# Patient Record
Sex: FEMALE | Race: WHITE | NOT HISPANIC OR LATINO | Employment: OTHER
[De-identification: names, ages, dates, MRNs, and addresses within clinical notes are randomized per-mention and may not be internally consistent; named-entity substitution may affect disease eponyms.]

---

## 2021-09-21 ENCOUNTER — TRANSCRIBE ORDERS (OUTPATIENT)
Dept: OTHER | Age: 66
End: 2021-09-21

## 2021-09-21 DIAGNOSIS — Z80.41 FAMILY HISTORY OF OVARIAN CANCER: Primary | ICD-10-CM

## 2021-09-25 ENCOUNTER — HEALTH MAINTENANCE LETTER (OUTPATIENT)
Age: 66
End: 2021-09-25

## 2021-11-12 NOTE — PROGRESS NOTES
11/15/2021    Referring Provider: Tammy Smith MD    Presenting Information:   I met with Imani Ghosh today for her telephone genetic counseling visit through the Cancer Risk Management Program to discuss her family history of ovarian, breast, and stomach cancer. She is here today to review this history, cancer screening recommendations, and available genetic testing options.    Personal History:  Imani is a 66 year old female. She does not have any personal history of cancer. In her hormonal-based medical history, she had her first menstrual period at age 12/13, does not have biological children, and underwent menopause in her early 50's. Imani has her ovaries, fallopian tubes and uterus in place. Angela had a pelvis ultrasound on 9/22/21 which found two small uterine fibroids. The ovaries were not visualized. She reports that she has no history of hormone replacement therapy.  She reports no oral contraceptive use.       Imani's most recent mammogram in March 2021 was normal. Follow-up was recommended annually. Imani began having colonoscopies at the age of 61. Her most recent colonoscopy in April 2016 found one sessile serrated adenoma in the descending colon. Follow-up was recommended in 5 years. Imani denies any history of dermatological exams. She does not regularly do any other cancer screening at this time. Imani reported no history of smoking and occasional alcohol use.    Family History: (Please see scanned pedigree for detailed family history information)    Imani's sister was diagnosed with ovarian cancer at age 65. Her sister has not had genetic testing and she does not know if her sister would be interested in genetic testing.     Maternal grandfather was diagnosed with stomach cancer at age 70. He passed away at age 82.     Mother's paternal grandmother (mIani's great grandfather) was diagnosed with stomach cancer at an unknown age.     Maternal grandmother's sister (Imani's great aunt) was  diagnosed with breast cancer in her 40's.     Paternal grandmother was diagnosed with cancer in her abdominal region. She passed away in her 70's.     Her maternal ethnicity is Nigerian and Mohawk. Her paternal ethnicity is Marshallese, Romansh, and Sinhala.  There is no known Ashkenazi Shinto ancestry on either side of her family. There is no reported consanguinity.    Discussion:  Imani's family history of ovarian, breast, and stomach cancer is suggestive of a hereditary cancer syndrome.  We reviewed the features of sporadic, familial, and hereditary cancers. We discussed the natural history and genetics of several hereditary cancer syndromes, including Hereditary Breast and Ovarian Cancer Syndrome (HBOC).   The most common cause of hereditary breast cancer is HBOC, which is caused by mutations in the BRCA1 and BRCA2 genes. Individuals with HBOC syndrome are at increased risk for several different cancers, including breast, ovarian, male breast, prostate, melanoma, and pancreatic cancer. HBOC typically presents with multiple family members diagnosed with breast cancer before age 50 and/or ovarian cancer.   A detailed handout regarding information about HBOC and related hereditary cancer syndromes will be provided to Imani via [employer] and can be found in the after visit summary. Topics included: inheritance pattern, cancer risks, cancer screening recommendations, and also risks, benefits and limitations of testing.  In looking at Imani's family history, it is possible that a cancer susceptibility gene is present due to her sister's diagnosis of ovarian cancer.  Based on her personal and family history, Imani meets current National Comprehensive Cancer Network (NCCN) criteria for genetic testing of high-penetrance ovarian cancer susceptibility genes (including BRCA1, BRCA2, BRIP1, MLH1, MSH2, MSH6, PMS2, EPCAM, PALB2, RAD51C,and  RAD51D).   We discussed that there are additional genes that could cause increased risk  for ovarian, breast, and/or stomach cancer. As many of these genes present with overlapping features in a family and accurate cancer risk cannot always be established based upon the pedigree analysis alone, it would be reasonable for Imani to consider panel genetic testing to analyze multiple genes at once.  We reviewed genetic testing options for Imani based on her personal and family history: a panel of genes associated with an increased risk for  hereditary breast and gynecologic cancers, or larger panel options to include genes associated with increased risk for multiple different cancer types. Imani expressed interest in [employer]'s Common Hereditary Cancers panel.   Genetic testing is available for 47 genes associated with cancers of the breast, ovary, uterus, prostate and gastrointestinal system: [employer] Common Hereditary Cancers panel (APC, SHARAD, AXIN2, BARD1, BMPR1A, BRCA1, BRCA2, BRIP1, CDH1, CDK4, CDKN2A, CHEK2, CTNNA1, DICER1, EPCAM, GREM1, HOXB13, KIT, MEN1, MLH1, MSH2, MSH3, MSH6, MUTYH, NBN, NF1, NTHL1, PALB2, PDGFRA, PMS2, POLD1, POLE, PTEN,RAD50, RAD51C, RAD51D, SDHA, SDHB, SDHC, SDHD, SMAD4, SMARCA4, STK11, TP53,TSC1, TSC2, VHL).    We discussed that many of these genes are associated with specific hereditary cancer syndromes and published management guidelines: Hereditary Breast and Ovarian Cancer syndrome (BRCA1, BRCA2), Ruiz syndrome (MLH1, MSH2, MSH6, PMS2, EPCAM), Familial Adenomatous Polyposis (APC), Hereditary Diffuse Gastric Cancer (CDH1), Familial Atypical Multiple Mole Melanoma syndrome (CDK4, CDKN2A), Multiple Endocrine Neoplasia type 1 (MEN1), Juvenile Polyposis syndrome (BMPR1A, SMAD4), Cowden syndrome (PTEN), Li Fraumeni syndrome (TP53), Hereditary Paraganglioma and Pheochromocytoma syndrome (SDHA, SDHB, SDHC, SDHD), Peutz-Jeghers syndrome (STK11), MUTYH Associated Polyposis (MUTYH), Tuberous sclerosis complex (TSC1, TSC2), Von Hippel-Lindau disease (VHL), and Neurofibromatosis type  1 (NF1).   The SHARAD, AXIN2, BRIP1, CHEK2, GREM1, MSH3, NBN, NTHL1, PALB2, POLD1, POLE, RAD51C, and RAD51D genes are associated with increased cancer risk and have published management guidelines for certain cancers.   The remaining genes (BARD1, CTNNA1, DICER1, HOXB13, KIT, PDGFRA, RAD50, and SMARCA4) are associated with increased cancer risk and may allow us to make medical recommendations when mutations are identified.   Imani would like to submit a blood sample for her genetic testing. She will go to her nearest [facility] at her earliest convenience to get her blood drawn for her genetic testing.   Verbal consent was given over the phone and written on the consent form. Turnaround time is approximately 4-6 weeks once the lab receives the sample.    Medical Management: For Imani, we reviewed that the information from genetic testing may determine:    additional cancer screening for which Imani may qualify (i.e. mammogram and breast MRI, more frequent colonoscopies, more frequent dermatologic exams, etc.),    options for risk reducing surgeries Imani could consider (i.e. bilateral mastectomy, surgery to remove her ovaries and/or uterus, etc.),      and targeted chemotherapies if she were to develop certain cancers in the future (i.e. immunotherapy for individuals with Ruiz syndrome, PARP inhibitors, etc.).     These recommendations and possible targeted chemotherapies will be discussed in detail once genetic testing is completed.     Plan:  1) Today Imani elected to proceed with the Common Hereditary Cancers Panel through [email or web address]. Therefore, consent was reviewed and verbally obtained for this testing.   2) Imani plans to schedule her blood draw appointment at a clinic that is convenient to her.   3) The results should be available in 4-6 weeks after her blood is drawn.  4) Imani will either have a telephone visit or video visit to discuss the results.  Additional recommendations  about screening will be made at that time.    Imani is a 66 year old who is being evaluated via a billable telephone visit.    Phone call duration: 45 minutes    Bren Villa MS  Genetic Counseling Intern  (P): [phone]    Attestation: Patient seen, evaluated and discussed with the Genetic Counseling Intern. I have verified the content of the note, which accurately reflects my assessment of the patient and the plan of care.     Supervising Genetic Counselor  Sy Harris MS, [employer] [facility] [employer]  Licensed, Certified Genetic Counselor  (P): [phone]  (F): [phone]

## 2021-11-15 ENCOUNTER — VIRTUAL VISIT (OUTPATIENT)
Dept: ONCOLOGY | Facility: CLINIC | Age: 66
End: 2021-11-15
Attending: INTERNAL MEDICINE
Payer: MEDICARE

## 2021-11-15 DIAGNOSIS — Z80.3 FAMILY HISTORY OF MALIGNANT NEOPLASM OF BREAST: ICD-10-CM

## 2021-11-15 DIAGNOSIS — Z80.41 FAMILY HISTORY OF MALIGNANT NEOPLASM OF OVARY: Primary | ICD-10-CM

## 2021-11-15 DIAGNOSIS — Z80.0 FAMILY HISTORY OF STOMACH CANCER: ICD-10-CM

## 2021-11-15 PROCEDURE — 96040 HC GENETIC COUNSELING, EACH 30 MINUTES: CPT | Mod: 95 | Performed by: GENETIC COUNSELOR, MS

## 2021-11-15 NOTE — LETTER
Cancer Risk Management  Program Locations    [facility]  [facility]  [facility]  Mailing Address  Cancer Risk Management Program  [address]  [place] [address]    New patient appointments  [phone]  November 15, 2021    Imani C Augie  [id number] [facility] [id number]    Dear Imani,    It was a pleasure speaking with you on the phone on November 15, 2021. Here is a copy of the progress note from our discussion. If you have any additional questions, please feel free to call.    Referring Provider: Tammy Smith MD    Presenting Information:   I met with Imani Augie today for her telephone genetic counseling visit through the Cancer Risk Management Program to discuss her family history of ovarian, breast, and stomach cancer. She is here today to review this history, cancer screening recommendations, and available genetic testing options.    Personal History:  Imani is a 66 year old female. She does not have any personal history of cancer. In her hormonal-based medical history, she had her first menstrual period at age 12/13, does not have biological children, and underwent menopause in her early 50's. Imani has her ovaries, fallopian tubes and uterus in place. Angela had a pelvis ultrasound on 9/22/21 which found two small uterine fibroids. The ovaries were not visualized. She reports that she has no history of hormone replacement therapy.  She reports no oral contraceptive use.       Imani's most recent mammogram in March 2021 was normal. Follow-up was recommended annually. Imani began having colonoscopies at the age of 61. Her most recent colonoscopy in April 2016 found one sessile serrated adenoma in the descending colon. Follow-up was recommended in 5 years. Imani denies any history of  dermatological exams. She does not regularly do any other cancer screening at this time. Imani reported no history of smoking and occasional alcohol use.    Family History: (Please see scanned pedigree for detailed family history information)    Imani's sister was diagnosed with ovarian cancer at age 65. Her sister has not had genetic testing and she does not know if her sister would be interested in genetic testing.     Maternal grandfather was diagnosed with stomach cancer at age 70. He passed away at age 82.     Mother's paternal grandmother (Imani's great grandfather) was diagnosed with stomach cancer at an unknown age.     Maternal grandmother's sister (Imani's great aunt) was diagnosed with breast cancer in her 40's.     Paternal grandmother was diagnosed with cancer in her abdominal region. She passed away in her 70's.     Her maternal ethnicity is Mongolian and Jay. Her paternal ethnicity is Bol[place]ian, B[place]y, and Icelandic.  There is no known Ashkenazi Jehovah's witness ancestry on either side of her family. There is no reported consanguinity.    Discussion:  Imani's family history of ovarian, breast, and stomach cancer is suggestive of a hereditary cancer syndrome.  We reviewed the features of sporadic, familial, and hereditary cancers. We discussed the natural history and genetics of several hereditary cancer syndromes, including Hereditary Breast and Ovarian Cancer Syndrome (HBOC).   The most common cause of hereditary breast cancer is HBOC, which is caused by mutations in the BRCA1 and BRCA2 genes. Individuals with HBOC syndrome are at increased risk for several different cancers, including breast, ovarian, male breast, prostate, melanoma, and pancreatic cancer. HBOC typically presents with multiple family members diagnosed with breast cancer before age 50 and/or ovarian cancer.   A detailed handout regarding information about HBOC and related hereditary cancer syndromes will be provided to Imani via [employer]  and can be found in the after visit summary. Topics included: inheritance pattern, cancer risks, cancer screening recommendations, and also risks, benefits and limitations of testing.  In looking at Imani's family history, it is possible that a cancer susceptibility gene is present due to her sister's diagnosis of ovarian cancer.  Based on her personal and family history, Imani meets current National Comprehensive Cancer Network (NCCN) criteria for genetic testing of high-penetrance ovarian cancer susceptibility genes (including BRCA1, BRCA2, BRIP1, MLH1, MSH2, MSH6, PMS2, EPCAM, PALB2, RAD51C,and  RAD51D).   We discussed that there are additional genes that could cause increased risk for ovarian, breast, and/or stomach cancer. As many of these genes present with overlapping features in a family and accurate cancer risk cannot always be established based upon the pedigree analysis alone, it would be reasonable for Imani to consider panel genetic testing to analyze multiple genes at once.  We reviewed genetic testing options for Imani based on her personal and family history: a panel of genes associated with an increased risk for  hereditary breast and gynecologic cancers, or larger panel options to include genes associated with increased risk for multiple different cancer types. Imani expressed interest in [employer]'s Common Hereditary Cancers panel.   Genetic testing is available for 47 genes associated with cancers of the breast, ovary, uterus, prostate and gastrointestinal system: [employer] Common Hereditary Cancers panel (APC, SHARAD, AXIN2, BARD1, BMPR1A, BRCA1, BRCA2, BRIP1, CDH1, CDK4, CDKN2A, CHEK2, CTNNA1, DICER1, EPCAM, GREM1, HOXB13, KIT, MEN1, MLH1, MSH2, MSH3, MSH6, MUTYH, NBN, NF1, NTHL1, PALB2, PDGFRA, PMS2, POLD1, POLE, PTEN,RAD50, RAD51C, RAD51D, SDHA, SDHB, SDHC, SDHD, SMAD4, SMARCA4, STK11, TP53,TSC1, TSC2, VHL).    We discussed that many of these genes are associated with specific hereditary cancer  syndromes and published management guidelines: Hereditary Breast and Ovarian Cancer syndrome (BRCA1, BRCA2), Ruiz syndrome (MLH1, MSH2, MSH6, PMS2, EPCAM), Familial Adenomatous Polyposis (APC), Hereditary Diffuse Gastric Cancer (CDH1), Familial Atypical Multiple Mole Melanoma syndrome (CDK4, CDKN2A), Multiple Endocrine Neoplasia type 1 (MEN1), Juvenile Polyposis syndrome (BMPR1A, SMAD4), Cowden syndrome (PTEN), Li Fraumeni syndrome (TP53), Hereditary Paraganglioma and Pheochromocytoma syndrome (SDHA, SDHB, SDHC, SDHD), Peutz-Jeghers syndrome (STK11), MUTYH Associated Polyposis (MUTYH), Tuberous sclerosis complex (TSC1, TSC2), Von Hippel-Lindau disease (VHL), and Neurofibromatosis type 1 (NF1).   The SHARAD, AXIN2, BRIP1, CHEK2, GREM1, MSH3, NBN, NTHL1, PALB2, POLD1, POLE, RAD51C, and RAD51D genes are associated with increased cancer risk and have published management guidelines for certain cancers.   The remaining genes (BARD1, CTNNA1, DICER1, HOXB13, KIT, PDGFRA, RAD50, and SMARCA4) are associated with increased cancer risk and may allow us to make medical recommendations when mutations are identified.   Imani would like to submit a blood sample for her genetic testing. She will go to her nearest [facility] at her earliest convenience to get her blood drawn for her genetic testing.   Verbal consent was given over the phone and written on the consent form. Turnaround time is approximately 4-6 weeks once the lab receives the sample.    Medical Management: For Imani, we reviewed that the information from genetic testing may determine:    additional cancer screening for which Imani may qualify (i.e. mammogram and breast MRI, more frequent colonoscopies, more frequent dermatologic exams, etc.),    options for risk reducing surgeries Imani could consider (i.e. bilateral mastectomy, surgery to remove her ovaries and/or uterus, etc.),      and targeted chemotherapies if she were to develop certain cancers  in the future (i.e. immunotherapy for individuals with Ruiz syndrome, PARP inhibitors, etc.).     These recommendations and possible targeted chemotherapies will be discussed in detail once genetic testing is completed.     Plan:  1) Today Imani elected to proceed with the Common Hereditary Cancers Panel through [employer]. Therefore, consent was reviewed and verbally obtained for this testing.   2) Imani plans to schedule her blood draw appointment at a clinic that is convenient to her.   3) The results should be available in 4-6 weeks after her blood is drawn.  4) Imani will either have a telephone visit or video visit to discuss the results.  Additional recommendations about screening will be made at that time.    Phone call duration: 45 minutes    Bren Villa MS  Genetic Counseling Intern  (P): [phone]    Attestation: Patient seen, evaluated and discussed with the Genetic Counseling Intern. I have verified the content of the note, which accurately reflects my assessment of the patient and the plan of care.     Supervising Genetic Counselor  Sy Harris MS, [facility]  Licensed, Certified Genetic Counselor  (P): [phone]

## 2021-12-02 ENCOUNTER — LAB (OUTPATIENT)
Dept: LAB | Facility: CLINIC | Age: 66
End: 2021-12-02
Attending: GENETIC COUNSELOR, MS
Payer: MEDICARE

## 2021-12-02 DIAGNOSIS — Z80.41 FAMILY HISTORY OF MALIGNANT NEOPLASM OF OVARY: ICD-10-CM

## 2021-12-02 DIAGNOSIS — Z80.0 FAMILY HISTORY OF STOMACH CANCER: ICD-10-CM

## 2021-12-02 PROCEDURE — 36415 COLL VENOUS BLD VENIPUNCTURE: CPT

## 2021-12-02 NOTE — NURSING NOTE
Chief Complaint   Patient presents with     Labs Only     Labs drawn by  in lab by RN.      Ag Mulligan RN

## 2021-12-06 NOTE — PATIENT INSTRUCTIONS
Assessing Cancer Risk  Only about 5-10% of cancers are thought to be due to an inherited cancer susceptibility gene.    These families often have:    Several people with the same or related types of cancer    Cancers diagnosed at a young age (before age 50)    Individuals with more than one primary cancer    Multiple generations of the family affected with cancer    Some people may be candidates for genetic testing of more than one gene.  For these families, genetic testing using a cancer panel may be offered.  These panels will test different genes known to increase the risk for breast, ovarian, uterine, and/or other cancers. All of the genes discussed below have published clinical management guidelines for individuals who are found to carry a mutation. The purpose of this handout is to serve as a brief summary of the genes analyzed by the panels used to inquire about hereditary breast and gynecologic cancer:  SHARAD, BRCA1, BRCA2, BRIP1, CDH1, CHEK2, MLH1, MSH2, MSH6, PMS2, EPCAM, PTEN, PALB2, RAD51C, RAD51D, and TP53.  ______________________________________________________________________________  Hereditary Breast and Ovarian Cancer Syndrome   (BRCA1 and BRCA2)  A single mutation in one of the copies of BRCA1 or BRCA2 increases the risk for breast and ovarian cancer, among others.  The risk for pancreatic cancer and melanoma may also be slightly increased in some families.  The chart below shows the chance that someone with a BRCA mutation would develop cancer in his or her lifetime1,2,3,4.        A person s ethnic background is also important to consider, as individuals of Ashkenazi Alevism ancestry have a higher chance of having a BRCA gene mutation.  There are three BRCA mutations that occur more frequently in this population.    Ruiz Syndrome   (MLH1, MSH2, MSH6, PMS2, and EPCAM)  Currently five genes are known to cause Ruiz Syndrome: MLH1, MSH2, MSH6, PMS2, and EPCAM.  A single mutation in one of the  Ruiz Syndrome genes increases the risk for colon, endometrial, ovarian, and stomach cancers.  Other cancers that occur less commonly in Ruiz Syndrome include urinary tract, skin, and brain cancers.  The chart below shows the chance that a person with Ruiz syndrome would develop cancer in his or her lifetime5.      *Cancer risk varies depending on Ruiz syndrome gene found    Cowden Syndrome   (PTEN)  Cowden syndrome is a hereditary condition that increases the risk for breast, thyroid, endometrial, colon, and kidney cancer.  Cowden syndrome is caused by a mutation in the PTEN gene.  A single mutation in one of the copies of PTEN causes Cowden syndrome and increases cancer risk.  The chart below shows the chance that someone with a PTEN mutation would develop cancer in their lifetime6,7.  Other benign features seen in some individuals with Cowden syndrome include benign skin lesions (facial papules, keratoses, lipomas), learning disability, autism, thyroid nodules, colon polyps, and larger head size.      *One recent study found breast cancer risk to be increased to 85%    Li-Fraumeni Syndrome   (TP53)  Li-Fraumeni Syndrome (LFS) is a cancer predisposition syndrome caused by a mutation in the TP53 gene. A single mutation in one of the copies of TP53 increases the risk for multiple cancers. Individuals with LFS are at an increased risk for developing cancer at a young age. The lifetime risk for development of a LFS-associated cancer is 50% by age 30 and 90% by age 60.   Core Cancers: Sarcomas, Breast, Brain, Lung, Leukemias/Lymphomas, Adrenocortical carcinomas  Other Cancers: Gastrointestinal, Thyroid, Skin, Genitourinary    Hereditary Diffuse Gastric Cancer   (CDH1)  Currently, one gene is known to cause hereditary diffuse gastric cancer (HDGC): CDH1.  Individuals with HDGC are at increased risk for diffuse gastric cancer and lobular breast cancer. Of people diagnosed with HDGC, 30-50% have a mutation in the CDH1  gene.  This suggests there are likely other genes that may cause HDGC that have not been identified yet.      Lifetime Cancer Risks    General Population HDGC    Diffuse Gastric  <1% ~80%   Breast 12% 39-52%         Additional Genes  SHARAD  SHARAD is a moderate-risk breast cancer gene. Women who have a mutation in SHARAD can have between a 2-4 fold increased risk for breast cancer compared to the general population8. SHARAD mutations have also been associated with increased risk for pancreatic cancer, however an estimate of this cancer risk is not well understood9. Individuals who inherit two SHARAD mutations have a condition called ataxia-telangiectasia (AT).  This rare autosomal recessive condition affects the nervous system and immune system, and is associated with progressive cerebellar ataxia beginning in childhood.  Individuals with ataxia-telangiectasia often have a weakened immune system and have an increased risk for childhood cancers.    PALB2  Mutations in PALB2 have been shown to increase the risk of breast cancer up to 33-58% in some families; where individuals fall within this risk range is dependent upon family chufveg11. PALB2 mutations have also been associated with increased risk for pancreatic cancer, although this risk has not been quantified yet.  Individuals who inherit two PALB2 mutations--one from their mother and one from their father--have a condition called Fanconi Anemia.  This rare autosomal recessive condition is associated with short stature, developmental delay, bone marrow failure, and increased risk for childhood cancers.    CHEK2   CHEK2 is a moderate-risk breast cancer gene.  Women who have a mutation in CHEK2 have around a 2-fold increased risk for breast cancer compared to the general population, and this risk may be higher depending upon family history.11,12,13 Mutations in CHEK2 have also been shown to increase the risk of a number of other cancers, including colon and prostate, however  these cancer risks are currently not well understood.    BRIP1, RAD51C and RAD51D  Mutations in BRIP1, RAD51C, and RAD51D have been shown to increase the risk of ovarian cancer and possibly female breast cancer as well14,15 .       Lifetime Cancer Risk    General Population BRIP1 RAD51C RAD51D   Ovarian 1-2% ~5-8% ~5-9% ~7-15%           Inheritance  All of the cancer syndromes reviewed above are inherited in an autosomal dominant pattern.  This means that if a parent has a mutation, each of his or her children will have a 50% chance of inheriting that same mutation.  Therefore, each child--male or female--would have a 50% chance of being at increased risk for developing cancer.      Image obtained from Genetics Home Reference, 2013     Mutations in some genes can occur de raymond, which means that a person s mutation occurred for the first time in them and was not inherited from a parent.  Now that they have the mutation, however, it can be passed on to future generations.    Genetic Testing  Genetic testing involves a blood test and will look at the genetic information in the SHARAD, BRCA1, BRCA2, BRIP1, CDH1, CHEK2, MLH1, MSH2, MSH6, PMS2, EPCAM, PTEN, PALB2, RAD51C, RAD51D, and TP53 genes for any harmful mutations that are associated with increased cancer risk.  If possible, it is recommended that the person(s) who has had cancer be tested before other family members.  That person will give us the most useful information about whether or not a specific gene is associated with the cancer in the family.    Results  There are three possible results of genetic testing:    Positive--a harmful mutation was identified in one or more of the genes    Negative--no mutation was identified in any of the genes on this panel    Variant of unknown significance--a variation in one of the genes was identified, but it is unclear how this impacts cancer risk in the family    Advantages and Disadvantages   There are advantages and  disadvantages to genetic testing.    Advantages    May clarify your cancer risk    Can help you make medical decisions    May explain the cancers in your family    May give useful information to your family members (if you share your results)    Disadvantages    Possible negative emotional impact of learning about inherited cancer risk    Uncertainty in interpreting a negative test result in some situations    Possible genetic discrimination concerns (see below)    Genetic Information Nondiscrimination Act (KAREN)  KAREN is a federal law that protects individuals from health insurance or employment discrimination based on a genetic test result alone.  Although rare, there are currently no legal discrimination protections in terms of life insurance, long term care, or disability insurances.  Visit the [employer] [place] website to learn more.    Reducing Cancer Risk  All of the genes described above have nationally recognized cancer screening guidelines that would be recommended for individuals who test positive.  In addition to increased cancer screening, surgeries may be offered or recommended to reduce cancer risk.  Recommendations are based upon an individual s genetic test result as well as their personal and family history of cancer.    Questions to Think About Regarding Genetic Testing:    What effect will the test result have on me and my relationship with my family members if I have an inherited gene mutation?  If I don t have a gene mutation?    Should I share my test results, and how will my family react to this news, which may also affect them?    Are my children ready to learn new information that may one day affect their own health?    Hereditary Cancer Resources    FORCE: Facing Our Risk of Cancer Empowered facingourrisk.org   Bright Pink bebrightpink.org   Li-Fraumeni Syndrome Association lfsassociation.org   PTEN World PTENworld.com   No stomach for cancer, Inc.  nostomachforcancer.org   Stomach cancer relief network Scrnet.org   Collaborative Group of the Americas on Inherited Colorectal Cancer (CGA) cgaicc.com    Cancer Care cancercare.org   American Cancer Society (ACS) cancer.org   National Cancer Otto (NCI) cancer.gov     Please call us if you have any questions or concerns.   Cancer Risk Management Program [phone] ([phone])  ? Sy Harris, MS, [facility] [place] [phone]  ? Nunu Enriquez, MS, [facility] [place]  [phone]  ? Miranda Flowers, MS, [facility] [place]  [phone]  ? Melita Wilmer, MS, [facility] [place] [phone]  ? Beatrice Sherlyn, MS, [facility] [place] [phone]  ? Tressa Marcel, MS, [facility] [place]  [phone]  ? Bren Villa, MS  [phone]    References  1. Jose SHEEHAN, Rudolph PDP, Tabitha S, Luis ALLAN, Serge JE, Vicki JL, Debora N, Miles H, Neo O, Bethanie A, Yordy B, Radicydni P, Mansourav S, Antonino DM, Estes N, Belen E, Augustine H, Mateo E, Odilon J, Grongavin J, Carmela B, Adina H, Thorlacius S, Eerola H, Nevjuancarlosna H, Betsy K, Edward OP. Average risks of breast and ovarian cancer associated with BRCA1 or BRCA2 mutations detected in case series unselected for family history: a combined analysis of 222 studies. Am J Hum Briseida. 2003;72:1117-30.  2. Robert N, Belkis M, Eri G.  BRCA1 and BRCA2 Hereditary Breast and Ovarian Cancer. Gene Reviews online. 2013.  3. Rizwan YC, Stefano S, Brenda G, Gonzales S. Breast cancer risk among male BRCA1 and BRCA2 mutation carriers. J Natl Cancer Inst. 2007;99:1811-4.  4. Juan Daniel MARTINEZ, Daphne I, Dada J, Jose E, Anjelica ER, Vipin F. Risk of breast cancer in male BRCA2 carriers. J Med Briseida. 2010;47:710-1.  5. National Comprehensive Cancer Network. Clinical practice guidelines in oncology, colorectal cancer screening. Available online (registration required). 2015.  6. Souleymane MARTINEZ, Suzanna J, Von J, Nancy LA, Margarito TAMAYO, Eng C. Lifetime cancer risks in individuals with germline PTEN mutations. Clin Cancer Res. 2012;18:400-7.  7. Pilarski R. Cowden  Syndrome: A Critical Review of the Clinical Literature. J Briseida . 2009:18:13-27.  8. Yumiko A, Bautista D, Stefanie S, Sarah P, Neal T, Bairon M, David B, Syd H, Amairani R, Avani K, Stefanie L, Juan Daniel DG, Antonino D, Joselo DF, Jd MR, The Breast Cancer Susceptibility Collaboration ([place]) & Kathia BURNS. SHARAD mutations that cause ataxia-telangiectasia are breast cancer susceptibility alleles. Nature Genetics. 2006;38:873-875  9. Will N , Dagoberto Y, Merced J, Bere L, Clayton GM , Jazmyn ML, Gallinger S, Alcocer AG, Syngal S, Lilia ML, Victoriano J , Smith R, Sheryl SZ, Anurag JR, Rakan VE, Leeann M, Voleobardo B, González N, Ksenia RH, Mason KW, and Belinda AP. SHARAD mutations in patients with hereditary pancreatic cancer. Cancer Discover. 2012;2:41-46  10. Jose DOMINGUEZ, et al. Breast-Cancer Risk in Families with Mutations in PALB2. NEJM. 2014; 371(6):497-506.  11. CHEK2 Breast Cancer Case-Control Consortium. CHEK2*1100delC and susceptibility to breast cancer: A collaborative analysis involving 10,860 breast cancer cases and 9,065 controls from 10 studies. Am J Hum Briseida, 74 (2004), pp. 2812-5438  12. Cristian T, Devi S, Arsalan K, et al. Spectrum of Mutations in BRCA1, BRCA2, CHEK2, and TP53 in Families at High Risk of Breast Cancer. MARILYN. 2006;295(12):0224-6891.   13. Lico C, Dulce D, Mya A, et al. Risk of breast cancer in women with a CHEK2 mutation with and without a family history of breast cancer. J Clin Oncol. 2011;29:4860-9354.  14. Roscoe H, Luz Marina E, Rita SJ, et al. Contribution of germline mutations in the RAD51B, RAD51C, and RAD51D genes to ovarian cancer in the population. J Clin Oncol. 2015;33(26):3266-7984. Doi:10.1200/JCO.2015.61.2408.  15. Danica T, Devika SMITH, Jess P, et al. Mutations in BRIP1 confer high risk of ovarian cancer. Diane Briseida. 2011;43(11):2106-7930. doi:10.1038/ng.955.

## 2021-12-14 ENCOUNTER — TELEPHONE (OUTPATIENT)
Dept: ONCOLOGY | Facility: CLINIC | Age: 66
End: 2021-12-14
Payer: MEDICARE

## 2021-12-14 NOTE — TELEPHONE ENCOUNTER
12/14/21 -[facility] to call [phone] opt5, opt2 to schedule virtual return with ileana Harris ANYTIME to discuss genetic testing options. -Leon

## 2021-12-16 ENCOUNTER — VIRTUAL VISIT (OUTPATIENT)
Dept: ONCOLOGY | Facility: CLINIC | Age: 66
End: 2021-12-16
Attending: GENETIC COUNSELOR, MS
Payer: MEDICARE

## 2021-12-16 DIAGNOSIS — Z80.41 FAMILY HISTORY OF MALIGNANT NEOPLASM OF OVARY: Primary | ICD-10-CM

## 2021-12-16 DIAGNOSIS — Z80.0 FAMILY HISTORY OF STOMACH CANCER: ICD-10-CM

## 2021-12-16 DIAGNOSIS — Z80.3 FAMILY HISTORY OF MALIGNANT NEOPLASM OF BREAST: ICD-10-CM

## 2021-12-16 PROCEDURE — 999N000069 HC STATISTIC GENETIC COUNSELING, < 16 MIN: Mod: TEL | Performed by: GENETIC COUNSELOR, MS

## 2021-12-16 NOTE — LETTER
Cancer Risk Management  Program [facility]  [facility] [place] [place] [facility]  Mailing Address  Cancer Risk Management Program  [address] [address]    New patient appointments  [phone]  December 16, 2021    Imani Ghosh  [address] [facility] [address]    Dear Imani,    It was a pleasure speaking with you on the phone on 12/16/2021. Here is a copy of the progress note from our discussion. If you have any additional questions, please feel free to call.    Referring Provider: Tammy Smith MD    Presenting Information:  I spoke to Imani by phone today to discuss her genetic testing results. Her blood was drawn on 12/2/21. The Common Hereditary Cancers panel was ordered from [facility]. This testing was done because of Imani's family history of ovarian, breast, and stomach cancer.    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 No other variants or mutations were found in the SMARCA4 gene. Given the uncertain significance of this result, medical management decisions should NOT be made based on this test result alone.    Of note, Imani is negative for mutations in APC, SHARAD, AXIN2, BARD1, BMPR1A, BRCA1, BRCA2, BRIP1, CDH1, CDK4, CDKN2A, CHEK2, CTNNA1, DICER1, EPCAM, GREM1, HOXB13, KIT, MEN1, MLH1, MSH2, MSH3, MSH6, MUTYH, NBN, NF1, NTHL1, PALB2, PDGFRA, PMS2, POLD1, POLE, PTEN, RAD50, RAD51C, RAD51D, SDHA, SDHB, SDHC, SDHD, SMAD4, STK11, TP53, TSC1, TSC2, and VHL. No mutations were found in any of the other 46 genes analyzed. This test involved sequencing and deletion/duplication analysis of all genes with the exceptions of EPCAM and GREM1  "(deletions/duplications only) and SDHA (sequencing only). We reviewed the autosomal dominant inheritance of these genes. Imani cannot pass on a mutation in any of these genes to her children based on this test result. Mutations in these genes do not skip generations.      A copy of the test report can be found in the Laboratory tab, dated 12/2/21, and named \"LABORATORY MISCELLANEOUS ORDER\". The report is scanned in as a linked document.    Interpretation:  We discussed several different interpretations of this inconclusive test result. It is not clear if this variant in the SMARCA4 gene is associated with increased cancer risk.  1. This variant may be a benign change that does not increase cancer risk.  2. This variant may be a harmful mutation that causes increased cancer risk.    SMARCA4 c.3874-3T>C (Intronic)  Harmful mutations in the SMARCA4 gene are associated with increased risk for a specific type of ovarian cancer called small cell carcinoma of the ovary, hypercalcemic type (SCCOHT). Harmful mutations in SMARCA4 may also be associated with rhabdoid tumor predisposition syndrome which causes an increased risk of developing rare childhood cancers called rhabdoid tumors, that form in the kidney and central nervous system. We discussed that medical management decisions are NOT recommended for individuals with a VUS result.    The laboratory is working to determine if this variant is harmful or benign, and they will contact me if it is reclassified. If this variant is determined to be a benign change, there may be a different gene or combination of genes and environment that are associated with the cancers in this family.    It is also important to consider that her sister may have had a mutation in one of the genes tested and she did not inherit it.     Inheritance:  We reviewed the autosomal dominant inheritance of this variant in the SMARCA4 gene. We discussed that Imani has a 50% chance to pass this variant " to each of her children. Likewise, each of her siblings has a 50% risk of having the same variant. Because it is unclear what, if any, risk is associated with this variant, clinical genetic testing for this SMARCA4 variant alone is not recommended for relatives.    Screening:  Based on this inconclusive test result, it is important for Imani and her relatives to refer back to the family history for appropriate cancer screening.      Due to Imani's family history of ovarian cancer, Imani and other close female relatives remain at slightly increased risk for ovarian cancer. We discussed available ovarian cancer screening (pelvic exams, CA-125 blood tests, and transvaginal ultrasounds) as well as the significant limitations of this screening. As such, this screening is not typically recommended. That being said, women in this family should discuss this screening and the signs and symptoms of ovarian cancer with their primary OB/GYN provider, as they may have individualized recommendations.    Other population cancer screening options, such as those recommended by the American Cancer Society and the National Comprehensive Cancer Network (NCCN), are also appropriate for Imani and her family. These screening recommendations may change if there are changes to Imani's personal and/or family history of cancer. Final screening recommendations should be made by each individual's primary care provider.      Additional Testing Considerations:  Although Imani's genetic testing result is inconclusive, other relatives may still carry a harmful gene mutation associated with ovarian, breast, and/or stomach cancer. Genetic counseling is recommended for other close maternal relatives, such as her sister with ovarian cancer, to discuss genetic testing options. If any of these relatives do pursue genetic testing, Imani is encouraged to contact me so that we may review the impact of their test results on her. Of note, Imani's  sister is reported to have had negative genetic testing. A copy of this report was not available for today's visit.    Summary:  We do not have an explanation for Imani's family history of cancer cancer. While no genetic changes were identified, Imani may still be at risk for certain cancers due to family history, environmental factors, or other genetic causes not identified by this test.  Because of that, it is important that she continue with cancer screening based on her personal and family history as discussed above.    Genetic testing is rapidly advancing, and new cancer susceptibility genes will most likely be identified in the future. Therefore, I encouraged Imani to contact me annually or if there are changes in her personal or family history. This may change how we assess her cancer risk, screening, and the testing we would offer.    Plan:  1.  A copy of the test results will be mailed to Imani.    2.  She plans to follow-up with her other providers.  3.  She should contact me regularly, or sooner if her family history changes.  4.  I will contact Imani if the laboratory informs me that this VUS has been reclassified.  This may change screening and testing recommendations for Imani and her relatives.    If Imani has any further questions, I encouraged her to contact me at [phone].    Time spent on the phone: 12 minutes    Sy Harris MS, [facility] [place]  Licensed, Certified Genetic Counselor  (P): [phone]  (F): [phone]

## 2021-12-16 NOTE — Clinical Note
"Hello,    Please enclose a copy of the test report from the laboratory tab titled \"laboratory miscellaneous order\" dated 12/2/21 (Order [id number]) to send to the patient along with the letter.    Thank you,  Sy  "

## 2021-12-16 NOTE — PROGRESS NOTES
"12/16/2021    Referring Provider: Tammy Smith MD    Presenting Information:  I spoke to Imani by phone today to discuss her genetic testing results. Her blood was drawn on 12/2/21. The Common Hereditary Cancers panel was ordered from [facility]. This testing was done because of Imani's family history of ovarian, breast, and stomach cancer.    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 No other variants or mutations were found in the SMARCA4 gene. Given the uncertain significance of this result, medical management decisions should NOT be made based on this test result alone.    Of note, Imani is negative for mutations in APC, SHARAD, AXIN2, BARD1, BMPR1A, BRCA1, BRCA2, BRIP1, CDH1, CDK4, CDKN2A, CHEK2, CTNNA1, DICER1, EPCAM, GREM1, HOXB13, KIT, MEN1, MLH1, MSH2, MSH3, MSH6, MUTYH, NBN, NF1, NTHL1, PALB2, PDGFRA, PMS2, POLD1, POLE, PTEN, RAD50, RAD51C, RAD51D, SDHA, SDHB, SDHC, SDHD, SMAD4, STK11, TP53, TSC1, TSC2, and VHL. No mutations were found in any of the other 46 genes analyzed. This test involved sequencing and deletion/duplication analysis of all genes with the exceptions of EPCAM and GREM1 (deletions/duplications only) and SDHA (sequencing only). We reviewed the autosomal dominant inheritance of these genes. Imani cannot pass on a mutation in any of these genes to her children based on this test result. Mutations in these genes do not skip generations.      A copy of the test report can be found in the Laboratory tab, dated 12/2/21, and named \"LABORATORY MISCELLANEOUS ORDER\". The report is scanned in as a linked document.    Interpretation:  We discussed several different interpretations of this inconclusive test result. It is not clear if this variant in the SMARCA4 gene is associated with increased cancer risk.  1. This variant may be a benign change " that does not increase cancer risk.  2. This variant may be a harmful mutation that causes increased cancer risk.    SMARCA4 c.3874-3T>C (Intronic)  Harmful mutations in the SMARCA4 gene are associated with increased risk for a specific type of ovarian cancer called small cell carcinoma of the ovary, hypercalcemic type (SCCOHT). Harmful mutations in SMARCA4 may also be associated with rhabdoid tumor predisposition syndrome which causes an increased risk of developing rare childhood cancers called rhabdoid tumors, that form in the kidney and central nervous system. We discussed that medical management decisions are NOT recommended for individuals with a VUS result.    The laboratory is working to determine if this variant is harmful or benign, and they will contact me if it is reclassified. If this variant is determined to be a benign change, there may be a different gene or combination of genes and environment that are associated with the cancers in this family.    It is also important to consider that her sister may have had a mutation in one of the genes tested and she did not inherit it.     Inheritance:  We reviewed the autosomal dominant inheritance of this variant in the SMARCA4 gene. We discussed that Imani has a 50% chance to pass this variant to each of her children. Likewise, each of her siblings has a 50% risk of having the same variant. Because it is unclear what, if any, risk is associated with this variant, clinical genetic testing for this SMARCA4 variant alone is not recommended for relatives.    Screening:  Based on this inconclusive test result, it is important for Imani and her relatives to refer back to the family history for appropriate cancer screening.      Due to Imani's family history of ovarian cancer, Imani and other close female relatives remain at slightly increased risk for ovarian cancer. We discussed available ovarian cancer screening (pelvic exams, CA-125 blood tests, and  transvaginal ultrasounds) as well as the significant limitations of this screening. As such, this screening is not typically recommended. That being said, women in this family should discuss this screening and the signs and symptoms of ovarian cancer with their primary OB/GYN provider, as they may have individualized recommendations.    Other population cancer screening options, such as those recommended by the American Cancer Society and the National Comprehensive Cancer Network (NCCN), are also appropriate for Imani and her family. These screening recommendations may change if there are changes to Imani's personal and/or family history of cancer. Final screening recommendations should be made by each individual's primary care provider.      Additional Testing Considerations:  Although Imani's genetic testing result is inconclusive, other relatives may still carry a harmful gene mutation associated with ovarian, breast, and/or stomach cancer. Genetic counseling is recommended for other close maternal relatives, such as her sister with ovarian cancer, to discuss genetic testing options. If any of these relatives do pursue genetic testing, Imani is encouraged to contact me so that we may review the impact of their test results on her. Of note, Imani's sister is reported to have had negative genetic testing. A copy of this report was not available for today's visit.    Summary:  We do not have an explanation for Imani's family history of cancer cancer. While no genetic changes were identified, Imani may still be at risk for certain cancers due to family history, environmental factors, or other genetic causes not identified by this test.  Because of that, it is important that she continue with cancer screening based on her personal and family history as discussed above.    Genetic testing is rapidly advancing, and new cancer susceptibility genes will most likely be identified in the future. Therefore, I  encouraged Imani to contact me annually or if there are changes in her personal or family history. This may change how we assess her cancer risk, screening, and the testing we would offer.    Plan:  1.  A copy of the test results will be mailed to Imani.    2.  She plans to follow-up with her other providers.  3.  She should contact me regularly, or sooner if her family history changes.  4.  I will contact Imani if the laboratory informs me that this VUS has been reclassified.  This may change screening and testing recommendations for Imani and her relatives.    If Imani has any further questions, I encouraged her to contact me at [phone].    Time spent on the phone: 12 minutes    Sy Harris MS, [facility] [place], [place]  Licensed, Certified Genetic Counselor  (P): [phone]  (F): [phone]

## 2022-07-02 ENCOUNTER — HEALTH MAINTENANCE LETTER (OUTPATIENT)
Age: 67
End: 2022-07-02

## 2022-08-12 ENCOUNTER — DOCUMENTATION ONLY (OUTPATIENT)
Dept: ONCOLOGY | Facility: CLINIC | Age: 67
End: 2022-08-12

## 2022-08-12 NOTE — PROGRESS NOTES
"8/12/2022    Referring Provider: Tammy Smith MD    Presenting Information:  Imani Ghosh was previously seen as part of the Cancer Risk Management Program due to her family history of ovarian, breast, and stomach cancer. Her blood was drawn on  12/2/2021, and the Common Hereditary Cancers test was ordered from [facility]. At that time, Imani was found to have a variant of unknown significance (VUS) in the SMARCA4 gene. This variant is called c.3874-3T>C (Intronic).     No clinically significant variants were detected at that time in the following genes tested: APC, SHARAD, AXIN2, BARD1, BMPR1A, BRCA1, BRCA2, BRIP1, CDH1, CDK4, CDKN2A, CHEK2, CTNNA1, DICER1, EPCAM, GREM1, HOXB13, KIT, MEN1, MLH1, MSH2, MSH3, MSH6, MUTYH, NBN, NF1, NTHL1, PALB2, PDGFRA, PMS2, POLD1, POLE, PTEN, RAD50, RAD51C, RAD51D, SDHA, SDHB, SDHC, SDHD, SMAD4, STK11, TP53, TSC1, TSC2, and VHL.    Result Reclassification:  Recently [facility] contacted me with a new report. The VUS in the SMARCA4 gene has been reclassified as likely benign. This means that the SMARCA4 variant found in Imani is most likely NOT associated with an increased risk for cancer.      A copy of this updated genetic test report can be found in the Laboratory tab called \"Lab Misc Order\" on 12/2/2021.    Screening Recommendations:  Imani should continue with cancer screening based on personal medical and family history, as previously discussed.    Summary:  We do not have an explanation for Imani's family history of cancer. While no genetic changes were identified, Imani may still be at risk for certain cancers due to family history, environmental factors, or other genetic causes not identified by this test. Because of that, it is important that she continue with cancer screening based on her personal and family history as previously discussed.    Genetic testing is rapidly advancing, and new cancer susceptibility genes will most likely be identified in the future. " Therefore, I encouraged Imani to contact me annually or if there are changes in her personal or family history. This may change how we assess her cancer risk, screening, and the testing we would offer.    Plan:   1) I will send Imani a copy of the new test report that reflects the change in classification.  2) If there are any further questions or concerns, she should not hesitate to contact me at [phone].    Bren Villa MS, [facility]  Licensed, Certified Genetic Counselor  Phone: [phone]

## 2022-12-26 ENCOUNTER — HEALTH MAINTENANCE LETTER (OUTPATIENT)
Age: 67
End: 2022-12-26

## 2023-07-09 ENCOUNTER — HEALTH MAINTENANCE LETTER (OUTPATIENT)
Age: 68
End: 2023-07-09

## 2023-11-26 ENCOUNTER — HEALTH MAINTENANCE LETTER (OUTPATIENT)
Age: 68
End: 2023-11-26

## 2024-09-01 ENCOUNTER — HEALTH MAINTENANCE LETTER (OUTPATIENT)
Age: 69
End: 2024-09-01